# Patient Record
(demographics unavailable — no encounter records)

---
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
## 2024-11-22 NOTE — PLAN
[Continue present medication regimen _____] : - Continue present medication regimen [unfilled] [Careful Teacher Selection] : - Next year's teacher(s) should be carefully selected to ensure a favorable fit [Continue IEP] : - Continue services as presently provided for in the Individualized Education Program [SETSS Services] : - Provision of  support services (SETSS) is recommended [Resource Room] : - Provision of special education services in a resource room is recommended [IEP Accomm. & Testing Modifications: ____] : - The IEP should  include accommodations and testing modifications. The plan should provide, at a minimum, for extended time for testing, and the opportunity to take or finish tests in a quiet, separate location. Additional accommodations recommended for this child are:  [unfilled] [ADHD EDU/Behav. Strategies (Gen)] : - Those educational and behavioral strategies known to be helpful to children with ADHD should be implemented in the classroom. [Instruction in Executive Function Skills] : - Direct, individualized instruction in executive function-related skills: i.e. task analysis, planning, organization, study strategies, memorization [Psychotherapy (child)] : - Psychotherapy for child [Fish Oil] : - Dietary supplementation with fish oil as a source of omega-3 fatty acids - guidelines and cautions discussed [Follow-up visit (med treatment monitoring): ____] : - Follow-up visit in [unfilled]  to evaluate response to medication and monitoring of medication treatment [CAPS] : - CAPS form completed 1-2 days before the visit. [IEP or IFSP] : - Copy of most recent Individualized Education Program (IEP) or Family Service Plan (IFSP) [Test reports] : - Reports of most recent psychological, educational, speech/language, PT, OT test results [FreeTextEntry8] : magnesium, saffron [Accuracy] : Accuracy and reliability of clinical impressions [Findings (To Date)] : Findings from evaluation (to date) [Clinical Basis] : Clinical basis for current diagnosis and clinical impressions [Goals / Benefits] : Goals & potential benefits of treatment with medication, as well as the limitations of pharmacotherapy [Stimulants] : Potential benefits and limitations of treatment with stimulant medication.  Potential adverse events were also reviewed, including insomnia, reduced appetite, change in blood pressure or heart rate, headache, stomachache, slowing of growth, moodiness, and onset of tics [Alpha-2s] : Potential benefits and limitations of treatment with alpha-2 agonists. Potential adverse events were also reviewed, including dry mouth, constipation, sedation, and change in blood pressure with potential for light-headedness when standing.  [Atomoxetine] : Potential benefits and limitations of treatment with atomoxetine. Potential adverse events were also reviewed, including sleepiness, gastrointestinal symptoms, change in blood pressure or heart rate, and suicidal thoughts. [Dev. Therapies: ____] : Benefits and limits of developmental therapies: [unfilled] [CAM Therapies] : Benefits and limits of CAM therapies [Counseling] : Benefits and limits of counseling or therapy [Behavior Modification] : Behavior modification strategies [Family Questions] : Family's questions were addressed [Diet] : Evidence-based clinical information about diet [Sleep] : The importance of sleep and strategies to ensure adequate sleep [Media / Screen Time] : Importance of limiting electronics, media, and screen time [FreeTextEntry3] : Continue current IEP/services\par  Continue Metadate CD 10 mg on school days\par  Answered parent/patient questions\par  Follow-up 3-4 months for further monitoring\par  Follow-up via telephone as needed\par
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
## 2025-02-20 NOTE — PHYSICAL EXAM
[Normal] : patient has a normal gait [Attention Intact] : attention intact [Well-behaved during visit] : well-behaved during visit [Appropriate eye contact] : appropriate eye contact [Positive mood] : positive mood [Answered questions appropriately] : answered questions appropriately [de-identified] : Walker is doing well. Conversational skills good

## 2025-03-28 NOTE — REASON FOR VISIT
[Follow-Up Visit] : a follow-up visit for [ADHD] : ADHD [Patient] : patient [Mother] : mother [FreeTextEntry2] : Cody has had some behavioral issues and temper outbursts. [TextEntry] : Telemedicine audiovisual 2 way follow up visit with consent. Mother and child in [facility]. DR. Williamson in [facility].

## 2025-03-28 NOTE — PLAN
[Continue IEP] : - Continue services as presently provided for in the Individualized Education Program [Social Skills Group (child)] : - Enrollment of child in a social skills development group [Psychotherapy (child)] : - Psychotherapy for child [Fish Oil] : - Dietary supplementation with fish oil as a source of omega-3 fatty acids - guidelines and cautions discussed [Follow-up visit (med treatment monitoring): ____] : - Follow-up visit in [unfilled]  to evaluate response to medication and monitoring of medication treatment [CAPS] : - CAPS form completed 1-2 days before the visit. [IEP or IFSP] : - Copy of most recent Individualized Education Program (IEP) or Family Service Plan (IFSP) [Test reports] : - Reports of most recent psychological, educational, speech/language, PT, OT test results [FreeTextEntry3] : concerta 18mg, side effects discussed. [FreeTextEntry8] : saffron, magnesium to be considered [Accuracy] : Accuracy and reliability of clinical impressions [Findings (To Date)] : Findings from evaluation (to date) [Clinical Basis] : Clinical basis for current diagnosis and clinical impressions [Goals / Benefits] : Goals & potential benefits of treatment with medication, as well as the limitations of pharmacotherapy [Stimulants] : Potential benefits and limitations of treatment with stimulant medication.  Potential adverse events were also reviewed, including insomnia, reduced appetite, change in blood pressure or heart rate, headache, stomachache, slowing of growth, moodiness, and onset of tics [Dev. Therapies: ____] : Benefits and limits of developmental therapies: [unfilled] [CAM Therapies] : Benefits and limits of CAM therapies [Counseling] : Benefits and limits of counseling or therapy [Behavior Modification] : Behavior modification strategies [Family Questions] : Family's questions were addressed [Diet] : Evidence-based clinical information about diet [Sleep] : The importance of sleep and strategies to ensure adequate sleep [Media / Screen Time] : Importance of limiting electronics, media, and screen time

## 2025-03-28 NOTE — PHYSICAL EXAM
[Normal] : patient has a normal gait [Attention Intact] : attention intact [Well-behaved during visit] : well-behaved during visit [Appropriate eye contact] : appropriate eye contact [Smiles responsively] : smiles responsively [Positive mood] : positive mood [Able to follow one step commands] : able to follow one step commands [Social referencing noted] : social referencing noted

## 2025-03-28 NOTE — HISTORY OF PRESENT ILLNESS
[SC: _____] : self-contained [unfilled] [IEP] : Individualized Education Program [Counseling: _____] : Counseling [unfilled] [TA: Time: _____] : Extra time for tests [unfilled] [TWNoteComboBox1] : 9th Grade [de-identified] : stable [de-identified] : some concerns [de-identified] : concerns [de-identified] : concerns [de-identified] : Sleep and appetite good [de-identified] : concerns [de-identified] : concerns [de-identified] : behavioral outbursts [Major Illness] : no major illness [Major Injury] : no major injury [Surgery] : no surgery [Hospitalizations] : no hospitalizations [New Medications] : no new medication [New Allergies] : no new allergies [FreeTextEntry6] : stomach virus 2 weeks ago proteinurea under investigation [No Side Effects] : no side effects

## 2025-03-28 NOTE — HISTORY OF PRESENT ILLNESS
[SC: _____] : self-contained [unfilled] [IEP] : Individualized Education Program [Counseling: _____] : Counseling [unfilled] [TA: Time: _____] : Extra time for tests [unfilled] [TWNoteComboBox1] : 9th Grade [de-identified] : stable [de-identified] : some concerns [de-identified] : concerns [de-identified] : concerns [de-identified] : Sleep and appetite good [de-identified] : concerns [de-identified] : concerns [de-identified] : behavioral outbursts [Major Illness] : no major illness [Major Injury] : no major injury [Surgery] : no surgery [Hospitalizations] : no hospitalizations [New Medications] : no new medication [New Allergies] : no new allergies [FreeTextEntry6] : stomach virus 2 weeks ago proteinurea under investigation [No Side Effects] : no side effects

## 2025-03-28 NOTE — REASON FOR VISIT
[Follow-Up Visit] : a follow-up visit for [ADHD] : ADHD [Patient] : patient [Mother] : mother [FreeTextEntry2] : Cody has had some behavioral issues and temper outbursts. [TextEntry] : Telemedicine audiovisual 2 way follow up visit with consent. Mother and child in [facility]. DR. Williamson in [facility].